# Patient Record
Sex: MALE | Race: WHITE | NOT HISPANIC OR LATINO | URBAN - NONMETROPOLITAN AREA
[De-identification: names, ages, dates, MRNs, and addresses within clinical notes are randomized per-mention and may not be internally consistent; named-entity substitution may affect disease eponyms.]

---

## 2023-12-13 ENCOUNTER — APPOINTMENT (OUTPATIENT)
Dept: URBAN - NONMETROPOLITAN AREA SURGERY 8 | Age: 28
Setting detail: DERMATOLOGY
End: 2023-12-13

## 2023-12-13 DIAGNOSIS — L30.9 DERMATITIS, UNSPECIFIED: ICD-10-CM

## 2023-12-13 PROCEDURE — OTHER PRESCRIPTION: OTHER

## 2023-12-13 PROCEDURE — OTHER BIOPSY BY SHAVE METHOD: OTHER

## 2023-12-13 PROCEDURE — OTHER COUNSELING: OTHER

## 2023-12-13 PROCEDURE — 11102 TANGNTL BX SKIN SINGLE LES: CPT

## 2023-12-13 PROCEDURE — OTHER ADDITIONAL NOTES: OTHER

## 2023-12-13 PROCEDURE — OTHER MIPS QUALITY: OTHER

## 2023-12-13 RX ORDER — CLOBETASOL PROPIONATE 0.5 MG/G
OINTMENT TOPICAL
Qty: 60 | Refills: 2 | Status: ERX | COMMUNITY
Start: 2023-12-13

## 2023-12-13 ASSESSMENT — LOCATION SIMPLE DESCRIPTION DERM: LOCATION SIMPLE: LEFT PLANTAR SURFACE

## 2023-12-13 ASSESSMENT — PAIN INTENSITY VAS: HOW INTENSE IS YOUR PAIN 0 BEING NO PAIN, 10 BEING THE MOST SEVERE PAIN POSSIBLE?: NO PAIN

## 2023-12-13 ASSESSMENT — ITCH NUMERIC RATING SCALE: HOW SEVERE IS YOUR ITCHING?: 4

## 2023-12-13 ASSESSMENT — LOCATION ZONE DERM: LOCATION ZONE: FEET

## 2023-12-13 ASSESSMENT — SEVERITY ASSESSMENT: SEVERITY: MODERATE

## 2023-12-13 ASSESSMENT — BSA RASH: BSA RASH: 1

## 2023-12-13 ASSESSMENT — LOCATION DETAILED DESCRIPTION DERM: LOCATION DETAILED: LEFT MEDIAL PLANTAR MIDFOOT

## 2023-12-13 NOTE — PROCEDURE: ADDITIONAL NOTES
Additional Notes: We will send prescription for today’s appointment that he will not  until the results are back. If no fungal elements are noted he will treat twice a day for 2 to 3 weeks and I will see him back in early January for follow up. Of note, he states he had possible psoriasis of the child with a history of widespread erythemas to scale involving his feet that was unresponsive to any treatments.
Render Risk Assessment In Note?: no
Detail Level: Zone

## 2023-12-13 NOTE — PROCEDURE: BIOPSY BY SHAVE METHOD
Body Location Override (Optional - Billing Will Still Be Based On Selected Body Map Location If Applicable): left plantar foot
Detail Level: Detailed
Depth Of Biopsy: dermis
Was A Bandage Applied: Yes
Size Of Lesion In Cm: 0
Biopsy Type: H and E
Biopsy Method: Dermablade
Anesthesia Type: 1% lidocaine with epinephrine
Anesthesia Volume In Cc: 0.5
Hemostasis: Drysol
Wound Care: Petrolatum
Dressing: bandage
Destruction After The Procedure: No
Type Of Destruction Used: Curettage
Curettage Text: The wound bed was treated with curettage after the biopsy was performed.
Cryotherapy Text: The wound bed was treated with cryotherapy after the biopsy was performed.
Electrodesiccation Text: The wound bed was treated with electrodesiccation after the biopsy was performed.
Electrodesiccation And Curettage Text: The wound bed was treated with electrodesiccation and curettage after the biopsy was performed.
Silver Nitrate Text: The wound bed was treated with silver nitrate after the biopsy was performed.
Lab: [id number]
Consent: Written consent was obtained and risks were reviewed including but not limited to scarring, infection, bleeding, scabbing, incomplete removal, nerve damage and allergy to anesthesia.
Post-Care Instructions: I reviewed with the patient in detail post-care instructions. Patient is to keep the biopsy site dry overnight, and then apply vaseline and a bandage  daily until healed.
Notification Instructions: Patient will be notified of biopsy results. However, patient instructed to call the office if not contacted within 2 weeks.
Billing Type: Third-Party Bill
Information: Selecting Yes will display possible errors in your note based on the variables you have selected. This validation is only offered as a suggestion for you. PLEASE NOTE THAT THE VALIDATION TEXT WILL BE REMOVED WHEN YOU FINALIZE YOUR NOTE. IF YOU WANT TO FAX A PRELIMINARY NOTE YOU WILL NEED TO TOGGLE THIS TO 'NO' IF YOU DO NOT WANT IT IN YOUR FAXED NOTE.

## 2024-01-16 ENCOUNTER — APPOINTMENT (OUTPATIENT)
Dept: URBAN - NONMETROPOLITAN AREA SURGERY 8 | Age: 29
Setting detail: DERMATOLOGY
End: 2024-01-16

## 2024-01-16 DIAGNOSIS — L40.0 PSORIASIS VULGARIS: ICD-10-CM

## 2024-01-16 PROCEDURE — OTHER PRESCRIPTION MEDICATION MANAGEMENT: OTHER

## 2024-01-16 PROCEDURE — OTHER MIPS QUALITY: OTHER

## 2024-01-16 PROCEDURE — OTHER ADDITIONAL NOTES: OTHER

## 2024-01-16 PROCEDURE — OTHER COUNSELING: OTHER

## 2024-01-16 PROCEDURE — 99213 OFFICE O/P EST LOW 20 MIN: CPT

## 2024-01-16 NOTE — PROCEDURE: PRESCRIPTION MEDICATION MANAGEMENT
Detail Level: Generalized
Continue Regimen: Continue clobetasol treatment as needed to affected areas.
Render In Strict Bullet Format?: No

## 2024-01-16 NOTE — PROCEDURE: COUNSELING
Patient Specific Counseling (Will Not Stick From Patient To Patient): Patient took PASE examination with a final score of 30.
Detail Level: Zone

## 2024-01-16 NOTE — PROCEDURE: ADDITIONAL NOTES
Render Risk Assessment In Note?: no
Additional Notes: Discuss that if his psoriasis progresses and fails to respond to topical treatment, we could consider systemic therapy with a biologic agent. He denies arthritis did fill out psoriasis pase questionnaire with the symptoms score of 14 a function score of 16 for a total pace of 30 most responses being strongly, disagree or disagree, with only agreeing that morning is his worst time and neutral on stiffness in the morning.
Detail Level: Zone